# Patient Record
Sex: MALE | URBAN - METROPOLITAN AREA
[De-identification: names, ages, dates, MRNs, and addresses within clinical notes are randomized per-mention and may not be internally consistent; named-entity substitution may affect disease eponyms.]

---

## 2022-08-11 ENCOUNTER — OFFICE VISIT (OUTPATIENT)
Dept: URBAN - METROPOLITAN AREA CLINIC 44 | Facility: CLINIC | Age: 67
End: 2022-08-11
Payer: COMMERCIAL

## 2022-08-11 DIAGNOSIS — H35.371 PUCKERING OF MACULA, RIGHT EYE: Primary | ICD-10-CM

## 2022-08-11 DIAGNOSIS — H43.393 OTHER VITREOUS OPACITIES, BILATERAL: ICD-10-CM

## 2022-08-11 DIAGNOSIS — Z96.1 PRESENCE OF INTRAOCULAR LENS: ICD-10-CM

## 2022-08-11 DIAGNOSIS — H04.123 TEAR FILM INSUFFICIENCY OF BILATERAL LACRIMAL GLANDS: ICD-10-CM

## 2022-08-11 DIAGNOSIS — H52.4 PRESBYOPIA: ICD-10-CM

## 2022-08-11 PROCEDURE — 92134 CPTRZ OPH DX IMG PST SGM RTA: CPT | Performed by: OPTOMETRIST

## 2022-08-11 PROCEDURE — 92004 COMPRE OPH EXAM NEW PT 1/>: CPT | Performed by: OPTOMETRIST

## 2022-08-11 PROCEDURE — 92133 CPTRZD OPH DX IMG PST SGM ON: CPT | Performed by: OPTOMETRIST

## 2022-08-11 ASSESSMENT — KERATOMETRY
OS: 44.63
OD: 44.75

## 2022-08-11 ASSESSMENT — INTRAOCULAR PRESSURE
OS: 22
OD: 22

## 2022-08-11 ASSESSMENT — VISUAL ACUITY
OS: 20/20
OD: 20/30

## 2022-08-11 NOTE — IMPRESSION/PLAN
Impression: Other vitreous opacities, bilateral: H43.393. Reports new floaters OS without flashes. No retinal defects noted. Patient reports floaters which are interfering with daily activities and vision. Plan: PLAN: Discussed S/S of RD/RT. Stressed importance to RTC immediately if S/S progress. RTC 2 weeks for DFE OS only.

## 2022-08-11 NOTE — IMPRESSION/PLAN
Impression: Puckering of macula, right eye: H35.371. Per OCT and exam condition is stable. Patient has no significant visual complaints at this time. Plan: Discussed findings and use of amsler grid to monitor. RTC 12 months for complete + OCT(Mac) and sooner if changes in vision are observed.

## 2022-09-01 ENCOUNTER — OFFICE VISIT (OUTPATIENT)
Dept: URBAN - METROPOLITAN AREA CLINIC 44 | Facility: CLINIC | Age: 67
End: 2022-09-01
Payer: COMMERCIAL

## 2022-09-01 DIAGNOSIS — H43.393 OTHER VITREOUS OPACITIES, BILATERAL: Primary | ICD-10-CM

## 2022-09-01 PROCEDURE — 99213 OFFICE O/P EST LOW 20 MIN: CPT | Performed by: OPTOMETRIST

## 2022-09-01 ASSESSMENT — INTRAOCULAR PRESSURE
OD: 20
OS: 18

## 2022-09-01 NOTE — IMPRESSION/PLAN
Impression: Other vitreous opacities, bilateral: H43.393. Reports no new floaters or flashes OS. Large floaters noted with no retinal defects. Patient reports floaters are interfering with daily activities and vision. Plan: PLAN: Discussed S/S of RD/RT. Stressed importance to RTC immediately if S/S progress. RTC 4 months for DFE OS + OPTOS + Mac.  Consider TPPV, MP if symptomatic

## 2023-01-05 ENCOUNTER — OFFICE VISIT (OUTPATIENT)
Dept: URBAN - METROPOLITAN AREA CLINIC 44 | Facility: CLINIC | Age: 68
End: 2023-01-05
Payer: COMMERCIAL

## 2023-01-05 DIAGNOSIS — H43.393 OTHER VITREOUS OPACITIES, BILATERAL: Primary | ICD-10-CM

## 2023-01-05 DIAGNOSIS — Z96.1 PRESENCE OF INTRAOCULAR LENS: ICD-10-CM

## 2023-01-05 DIAGNOSIS — H35.3121 NEXDTVE AGE-RELATED MCLR DEGN, LEFT EYE, EARLY DRY STAGE: ICD-10-CM

## 2023-01-05 DIAGNOSIS — H35.371 PUCKERING OF MACULA, RIGHT EYE: ICD-10-CM

## 2023-01-05 PROCEDURE — 92014 COMPRE OPH EXAM EST PT 1/>: CPT | Performed by: OPTOMETRIST

## 2023-01-05 ASSESSMENT — INTRAOCULAR PRESSURE
OS: 17
OD: 20

## 2023-01-05 ASSESSMENT — KERATOMETRY
OS: 44.75
OD: 44.75

## 2023-01-05 ASSESSMENT — VISUAL ACUITY
OS: 20/20
OD: 20/30

## 2023-01-05 NOTE — IMPRESSION/PLAN
Impression: Presence of intraocular lens: Z96.1. S/P YAG OU doing well. Centered Plan: PLAN: RTC 12 months for complete. Check position of IOL.

## 2023-01-05 NOTE — IMPRESSION/PLAN
Impression: Other vitreous opacities, bilateral: H43.393. Reports no floaters OU. No retinal defects noted. Patient reports floaters which are seen OS. Plan: PLAN: Discussed TPPV vs observing. Patient elects to observe. S/S of RD/RT. Stressed importance to RTC immediately if S/S progress.

## 2023-01-05 NOTE — IMPRESSION/PLAN
Impression: Nexdtve age-related mclr degn, left eye, early dry stage: H35.3121. Per OCT no SRF. Plan: PLAN: Discussed risk associated with smoking and reviewed lifestyle changes to support good eye health. Recommend Lutein/Zeaxanthin supplements to reduce risk of progression.  RTC  if notes and decreased vision or distortion in vision otherwise, RTC 12 months for complete exam + OCT (Mac)

## 2023-01-05 NOTE — IMPRESSION/PLAN
Impression: Puckering of macula, right eye: H35.371. Denies distortion. VA stable. Per OCT no ME. Plan: Discussed findings. RTC 12 months for complete + OCT(Mac) and sooner if changes in vision are observed.